# Patient Record
Sex: MALE | Race: WHITE | NOT HISPANIC OR LATINO | Employment: OTHER | URBAN - NONMETROPOLITAN AREA
[De-identification: names, ages, dates, MRNs, and addresses within clinical notes are randomized per-mention and may not be internally consistent; named-entity substitution may affect disease eponyms.]

---

## 2017-01-06 ENCOUNTER — ANTICOAGULATION VISIT (OUTPATIENT)
Dept: CARDIOLOGY | Facility: CLINIC | Age: 82
End: 2017-01-06

## 2017-01-10 ENCOUNTER — ANTICOAGULATION VISIT (OUTPATIENT)
Dept: CARDIOLOGY | Facility: CLINIC | Age: 82
End: 2017-01-10

## 2017-01-10 LAB — INR PPP: 2.51

## 2017-02-08 ENCOUNTER — ANTICOAGULATION VISIT (OUTPATIENT)
Dept: CARDIOLOGY | Facility: CLINIC | Age: 82
End: 2017-02-08

## 2017-02-08 LAB — INR PPP: 2.81

## 2017-03-08 ENCOUNTER — ANTICOAGULATION VISIT (OUTPATIENT)
Dept: CARDIOLOGY | Facility: CLINIC | Age: 82
End: 2017-03-08

## 2017-03-08 LAB — INR PPP: 2.44

## 2017-04-06 ENCOUNTER — ANTICOAGULATION VISIT (OUTPATIENT)
Dept: CARDIOLOGY | Facility: CLINIC | Age: 82
End: 2017-04-06

## 2017-04-06 LAB — INR PPP: 2.77

## 2017-05-08 ENCOUNTER — ANTICOAGULATION VISIT (OUTPATIENT)
Dept: CARDIOLOGY | Facility: CLINIC | Age: 82
End: 2017-05-08

## 2017-05-08 LAB — INR PPP: 2.7

## 2017-06-07 ENCOUNTER — ANTICOAGULATION VISIT (OUTPATIENT)
Dept: CARDIOLOGY | Facility: CLINIC | Age: 82
End: 2017-06-07

## 2017-06-07 LAB — INR PPP: 3.1

## 2017-06-09 RX ORDER — DILTIAZEM HYDROCHLORIDE 180 MG/1
180 CAPSULE, COATED, EXTENDED RELEASE ORAL DAILY
COMMUNITY
End: 2017-10-18 | Stop reason: DRUGHIGH

## 2017-06-09 RX ORDER — WARFARIN SODIUM 5 MG/1
5 TABLET ORAL EVERY OTHER DAY
COMMUNITY

## 2017-06-09 RX ORDER — FUROSEMIDE 40 MG/1
40 TABLET ORAL DAILY
COMMUNITY

## 2017-06-09 RX ORDER — ASPIRIN 325 MG
81 TABLET, DELAYED RELEASE (ENTERIC COATED) ORAL DAILY
COMMUNITY
End: 2017-06-26 | Stop reason: DRUGHIGH

## 2017-06-09 RX ORDER — AMLODIPINE BESYLATE AND BENAZEPRIL HYDROCHLORIDE 5; 20 MG/1; MG/1
1 CAPSULE ORAL DAILY
COMMUNITY
End: 2019-06-10 | Stop reason: ALTCHOICE

## 2017-06-09 RX ORDER — METOPROLOL SUCCINATE 25 MG/1
25 TABLET, EXTENDED RELEASE ORAL DAILY
COMMUNITY

## 2017-06-20 ENCOUNTER — ANTICOAGULATION VISIT (OUTPATIENT)
Dept: CARDIOLOGY | Facility: CLINIC | Age: 82
End: 2017-06-20

## 2017-06-20 LAB — INR PPP: 2.29

## 2017-06-26 ENCOUNTER — OFFICE VISIT (OUTPATIENT)
Dept: CARDIOLOGY | Facility: CLINIC | Age: 82
End: 2017-06-26

## 2017-06-26 VITALS
OXYGEN SATURATION: 98 % | HEIGHT: 73 IN | SYSTOLIC BLOOD PRESSURE: 119 MMHG | WEIGHT: 253.6 LBS | HEART RATE: 61 BPM | BODY MASS INDEX: 33.61 KG/M2 | DIASTOLIC BLOOD PRESSURE: 51 MMHG

## 2017-06-26 DIAGNOSIS — R06.09 DYSPNEA ON EXERTION: ICD-10-CM

## 2017-06-26 DIAGNOSIS — I48.91 ATRIAL FIBRILLATION, UNSPECIFIED TYPE (HCC): Primary | ICD-10-CM

## 2017-06-26 DIAGNOSIS — R07.9 CHEST PAIN, UNSPECIFIED TYPE: ICD-10-CM

## 2017-06-26 DIAGNOSIS — E78.2 MIXED HYPERLIPIDEMIA: ICD-10-CM

## 2017-06-26 DIAGNOSIS — I10 ESSENTIAL HYPERTENSION: ICD-10-CM

## 2017-06-26 DIAGNOSIS — M79.89 LEG SWELLING: ICD-10-CM

## 2017-06-26 DIAGNOSIS — I25.10 CORONARY ARTERY DISEASE INVOLVING NATIVE CORONARY ARTERY OF NATIVE HEART WITHOUT ANGINA PECTORIS: ICD-10-CM

## 2017-06-26 PROCEDURE — 99214 OFFICE O/P EST MOD 30 MIN: CPT | Performed by: PHYSICIAN ASSISTANT

## 2017-06-26 PROCEDURE — 93000 ELECTROCARDIOGRAM COMPLETE: CPT | Performed by: PHYSICIAN ASSISTANT

## 2017-06-26 RX ORDER — WARFARIN SODIUM 2.5 MG/1
2.5 TABLET ORAL EVERY OTHER DAY
COMMUNITY
End: 2021-06-14

## 2017-06-26 RX ORDER — ROSUVASTATIN CALCIUM 10 MG/1
10 TABLET, COATED ORAL DAILY
COMMUNITY

## 2017-06-26 RX ORDER — ASPIRIN 81 MG/1
81 TABLET, CHEWABLE ORAL DAILY
COMMUNITY

## 2017-06-26 RX ORDER — CELECOXIB 200 MG/1
200 CAPSULE ORAL DAILY
COMMUNITY
End: 2017-06-26 | Stop reason: SINTOL

## 2017-06-26 NOTE — PROGRESS NOTES
Subjective:     Encounter Date:06/26/2017      Patient ID: Cristhian Tatum is a 82 y.o. male w hx of CAD, HTN, HLD, Afib who [facility]. The patient relates he's noticed some left sided chest pain, particularly with exertion, over the last few months. He states this is a dull pain, rating it at worst 3/10. He denies any radiation. He says that he rests and this pain goes away. He reports worsening dyspnea on exertion over the past few months. He relates he is unable to exercise or exert himself much at all due to his dyspnea. He denies any orthopnea but reports worsening BLE edema. He states he is compliant with lasix and continues with leg swelling. He denies palpitations, dizziness, syncope or related.     Chief Complaint:  Coronary Artery Disease   Presents for follow-up visit. Symptoms include chest pain, leg swelling and shortness of breath. Pertinent negatives include no dizziness, palpitations or weight gain. Risk factors include hyperlipidemia. The symptoms have been worsening. Compliance with diet is variable. Compliance with exercise is poor. Compliance with medications is good.   Hypertension   This is a chronic problem. The current episode started more than 1 year ago. The problem is unchanged. The problem is controlled. Associated symptoms include chest pain and shortness of breath. Pertinent negatives include no malaise/fatigue, orthopnea, palpitations or PND. There are no associated agents to hypertension. Risk factors for coronary artery disease include male gender and dyslipidemia. Past treatments include diuretics, calcium channel blockers and beta blockers. The current treatment provides moderate improvement. Compliance problems include exercise and diet.  Hypertensive end-organ damage includes CAD/MI. There is no history of chronic renal disease.   Hyperlipidemia   This is a chronic problem. The current episode started more than 1 year ago. He has no  history of chronic renal disease or diabetes. Associated symptoms include chest pain and shortness of breath. Pertinent negatives include no focal weakness or myalgias. Current antihyperlipidemic treatment includes statins. The current treatment provides moderate improvement of lipids. Compliance problems include adherence to exercise and adherence to diet.  Risk factors for coronary artery disease include dyslipidemia, hypertension, male sex and obesity.   Atrial Fibrillation   Presents for follow-up visit. Symptoms include chest pain and shortness of breath. Symptoms are negative for dizziness, palpitations, syncope and tachycardia. Past medical history includes atrial fibrillation, CAD and hyperlipidemia. There are no medication compliance problems.       The following portions of the patient's history were reviewed and updated as appropriate: allergies, current medications, past family history, past medical history, past social history, past surgical history and problem list.    Review of Systems   Constitution: Negative for malaise/fatigue and weight gain.   Cardiovascular: Positive for chest pain, dyspnea on exertion and leg swelling. Negative for claudication, irregular heartbeat, near-syncope, orthopnea, palpitations, paroxysmal nocturnal dyspnea and syncope.   Respiratory: Positive for shortness of breath. Negative for hemoptysis.    Hematologic/Lymphatic: Negative for bleeding problem.   Skin: Negative for poor wound healing.   Musculoskeletal: Negative for myalgias.   Gastrointestinal: Negative for melena, nausea and vomiting.   Genitourinary: Negative for hematuria.   Neurological: Negative for dizziness, focal weakness and light-headedness.   Psychiatric/Behavioral: Negative for memory loss.   All other systems reviewed and are negative.        ECG 12 Lead  Date/Time: 6/26/2017 4:22 PM  Performed by: DANNA VILLASEÑOR  Authorized by: DANNA VILLASEÑOR   Comparison: compared with previous ECG from  "2/18/2016  Similar to previous ECG  Rhythm: atrial fibrillation  Rate: normal  Conduction: incomplete RBBB  ST Flattening: all  QRS axis: normal  Clinical impression: abnormal ECG               Objective:     Physical Exam   Constitutional: He is oriented to person, place, and time. He appears well-developed and well-nourished.   HENT:   Head: Normocephalic and atraumatic.   Eyes: Conjunctivae and EOM are normal. Pupils are equal, round, and reactive to light.   Neck: Normal range of motion. Neck supple. No JVD present.   Cardiovascular: Normal rate, S1 normal, S2 normal, normal heart sounds, intact distal pulses and normal pulses.  An irregularly irregular rhythm present.   No murmur heard.  Pulmonary/Chest: Effort normal and breath sounds normal. No respiratory distress.   Abdominal: Soft. Bowel sounds are normal. He exhibits no distension.   Musculoskeletal: He exhibits edema.   Neurological: He is alert and oriented to person, place, and time.   Skin: Skin is warm and dry.   Psychiatric: He has a normal mood and affect. Judgment normal.     /51 (BP Location: Right arm, Patient Position: Sitting, Cuff Size: Adult)  Pulse 61  Ht 73\" (185.4 cm)  Wt 253 lb 9.6 oz (115 kg)  SpO2 98%  BMI 33.46 kg/m2    Current Outpatient Prescriptions:   •  amLODIPine-benazepril (LOTREL 5-20) 5-20 MG per capsule, Take 1 capsule by mouth Daily., Disp: , Rfl:   •  aspirin 81 MG chewable tablet, Chew 81 mg Daily., Disp: , Rfl:   •  diltiaZEM CD (CARDIZEM CD) 180 MG 24 hr capsule, Take 180 mg by mouth Daily., Disp: , Rfl:   •  furosemide (LASIX) 40 MG tablet, Take 40 mg by mouth Daily., Disp: , Rfl:   •  metoprolol succinate XL (TOPROL-XL) 25 MG 24 hr tablet, Take 25 mg by mouth Daily., Disp: , Rfl:   •  rosuvastatin (CRESTOR) 10 MG tablet, Take 10 mg by mouth Daily., Disp: , Rfl:   •  warfarin (COUMADIN) 2.5 MG tablet, Take 2.5 mg by mouth Every Other Day. Every other day, Disp: , Rfl:   •  warfarin (COUMADIN) 5 MG tablet, " Take 5 mg by mouth Every Other Day., Disp: , Rfl:   Past Medical History:   Diagnosis Date   • A-fib    • CAD (coronary artery disease)    • Cancer    • HLD (hyperlipidemia)    • HTN (hypertension)      Past Surgical History:   Procedure Laterality Date   • CORONARY ANGIOPLASTY WITH STENT PLACEMENT     • TOTAL HIP ARTHROPLASTY       No Known Allergies  Social History     Social History   • Marital status:      Spouse name: N/A   • Number of children: N/A   • Years of education: N/A     Occupational History   • Not on file.     Social History Main Topics   • Smoking status: Never Smoker   • Smokeless tobacco: Never Used   • Alcohol use No   • Drug use: No   • Sexual activity: Defer     Other Topics Concern   • Not on file     Social History Narrative     Family History   Problem Relation Age of Onset   • Stroke Mother    • Cancer Father            Assessment:          Diagnosis Plan   1. Atrial fibrillation, unspecified type     2. Coronary artery disease involving native coronary artery of native heart without angina pectoris     3. Essential hypertension     4. Mixed hyperlipidemia      on statin, followed by PCP   5. Dyspnea on exertion  Adult Transthoracic Echo Complete With Contrast    Stress Test With Myocardial Perfusion - One Day   6. Chest pain, unspecified type  Stress Test With Myocardial Perfusion - One Day   7. Leg swelling  Adult Transthoracic Echo Complete With Contrast          Plan:       1. Echo   2. Lexiscan  3. Counseled on diet, exercise, medication compliance. Continue Lasix as prescribed.   4. Follow-up with Dr. Barbosa in 3 months, unless needed sooner  5. Present to ED if chest pain recurs and unrelieved by NTG x 3 within 15 minutes.   6. Verbalized understanding of instructions.

## 2017-07-20 ENCOUNTER — HOSPITAL ENCOUNTER (OUTPATIENT)
Dept: CARDIOLOGY | Facility: HOSPITAL | Age: 82
Discharge: HOME | End: 2017-07-20

## 2017-07-20 ENCOUNTER — HOSPITAL ENCOUNTER (OUTPATIENT)
Dept: CARDIOLOGY | Facility: HOSPITAL | Age: 82
Discharge: HOME | End: 2017-07-20
Admitting: PHYSICIAN ASSISTANT

## 2017-07-20 VITALS — HEART RATE: 63 BPM | SYSTOLIC BLOOD PRESSURE: 121 MMHG | DIASTOLIC BLOOD PRESSURE: 6 MMHG

## 2017-07-20 VITALS
HEIGHT: 73 IN | SYSTOLIC BLOOD PRESSURE: 119 MMHG | WEIGHT: 253 LBS | BODY MASS INDEX: 33.53 KG/M2 | DIASTOLIC BLOOD PRESSURE: 51 MMHG

## 2017-07-20 DIAGNOSIS — M79.89 LEG SWELLING: ICD-10-CM

## 2017-07-20 DIAGNOSIS — R06.09 DYSPNEA ON EXERTION: ICD-10-CM

## 2017-07-20 DIAGNOSIS — R07.9 CHEST PAIN, UNSPECIFIED TYPE: ICD-10-CM

## 2017-07-20 LAB
BH CV ECHO MEAS - AI DEC SLOPE: 177 CM/SEC^2
BH CV ECHO MEAS - AI MAX PG: 43.3 MMHG
BH CV ECHO MEAS - AI MAX VEL: 329 CM/SEC
BH CV ECHO MEAS - AI P1/2T: 544.4 MSEC
BH CV ECHO MEAS - AO MAX PG (FULL): 4.3 MMHG
BH CV ECHO MEAS - AO MAX PG: 6.7 MMHG
BH CV ECHO MEAS - AO MEAN PG (FULL): 2 MMHG
BH CV ECHO MEAS - AO MEAN PG: 3 MMHG
BH CV ECHO MEAS - AO ROOT AREA (BSA CORRECTED): 1.3
BH CV ECHO MEAS - AO ROOT AREA: 8 CM^2
BH CV ECHO MEAS - AO ROOT DIAM: 3.2 CM
BH CV ECHO MEAS - AO V2 MAX: 129 CM/SEC
BH CV ECHO MEAS - AO V2 MEAN: 81.4 CM/SEC
BH CV ECHO MEAS - AO V2 VTI: 28.1 CM
BH CV ECHO MEAS - AVA(I,A): 2.4 CM^2
BH CV ECHO MEAS - AVA(I,D): 2.4 CM^2
BH CV ECHO MEAS - AVA(V,A): 2.1 CM^2
BH CV ECHO MEAS - AVA(V,D): 2.1 CM^2
BH CV ECHO MEAS - BSA(HAYCOCK): 2.5 M^2
BH CV ECHO MEAS - BSA: 2.4 M^2
BH CV ECHO MEAS - BZI_BMI: 33.4 KILOGRAMS/M^2
BH CV ECHO MEAS - BZI_METRIC_HEIGHT: 185.4 CM
BH CV ECHO MEAS - BZI_METRIC_WEIGHT: 114.8 KG
BH CV ECHO MEAS - CONTRAST EF 4CH: 64.3 ML/M^2
BH CV ECHO MEAS - EDV(CUBED): 148.9 ML
BH CV ECHO MEAS - EDV(MOD-SP4): 129 ML
BH CV ECHO MEAS - EDV(TEICH): 135.3 ML
BH CV ECHO MEAS - EF(CUBED): 73.6 %
BH CV ECHO MEAS - EF(MOD-SP4): 64.3 %
BH CV ECHO MEAS - EF(TEICH): 65 %
BH CV ECHO MEAS - ESV(CUBED): 39.3 ML
BH CV ECHO MEAS - ESV(MOD-SP4): 46 ML
BH CV ECHO MEAS - ESV(TEICH): 47.4 ML
BH CV ECHO MEAS - FS: 35.8 %
BH CV ECHO MEAS - IVS/LVPW: 0.89
BH CV ECHO MEAS - IVSD: 0.8 CM
BH CV ECHO MEAS - LA DIMENSION: 5.2 CM
BH CV ECHO MEAS - LA/AO: 1.6
BH CV ECHO MEAS - LAT PEAK E' VEL: 10.9 CM/SEC
BH CV ECHO MEAS - LV DIASTOLIC VOL/BSA (35-75): 54.2 ML/M^2
BH CV ECHO MEAS - LV MASS(C)D: 162.1 GRAMS
BH CV ECHO MEAS - LV MASS(C)DI: 68.2 GRAMS/M^2
BH CV ECHO MEAS - LV MAX PG: 2.4 MMHG
BH CV ECHO MEAS - LV MEAN PG: 1 MMHG
BH CV ECHO MEAS - LV SYSTOLIC VOL/BSA (12-30): 19.3 ML/M^2
BH CV ECHO MEAS - LV V1 MAX: 76.9 CM/SEC
BH CV ECHO MEAS - LV V1 MEAN: 50.5 CM/SEC
BH CV ECHO MEAS - LV V1 VTI: 19.8 CM
BH CV ECHO MEAS - LVIDD: 5.3 CM
BH CV ECHO MEAS - LVIDS: 3.4 CM
BH CV ECHO MEAS - LVLD AP4: 7.3 CM
BH CV ECHO MEAS - LVLS AP4: 6.4 CM
BH CV ECHO MEAS - LVOT AREA (M): 3.5 CM^2
BH CV ECHO MEAS - LVOT AREA: 3.5 CM^2
BH CV ECHO MEAS - LVOT DIAM: 2.1 CM
BH CV ECHO MEAS - LVPWD: 0.9 CM
BH CV ECHO MEAS - MED PEAK E' VEL: 8.05 CM/SEC
BH CV ECHO MEAS - MR ALIAS VEL: 30 CM/SEC
BH CV ECHO MEAS - MR ERO: 0.04 CM^2
BH CV ECHO MEAS - MR FLOW RATE: 17 CM^3/SEC
BH CV ECHO MEAS - MR MAX PG: 71.6 MMHG
BH CV ECHO MEAS - MR MAX VEL: 423 CM/SEC
BH CV ECHO MEAS - MR MEAN PG: 45 MMHG
BH CV ECHO MEAS - MR MEAN VEL: 302 CM/SEC
BH CV ECHO MEAS - MR PISA RADIUS: 0.3 CM
BH CV ECHO MEAS - MR PISA: 0.57 CM^2
BH CV ECHO MEAS - MR VOLUME: 6 ML
BH CV ECHO MEAS - MR VTI: 149 CM
BH CV ECHO MEAS - MV DEC TIME: 0.21 SEC
BH CV ECHO MEAS - MV E MAX VEL: 97 CM/SEC
BH CV ECHO MEAS - RAP SYSTOLE: 10 MMHG
BH CV ECHO MEAS - RVDD: 4.3 CM
BH CV ECHO MEAS - RVSP: 41.8 MMHG
BH CV ECHO MEAS - SI(AO): 95 ML/M^2
BH CV ECHO MEAS - SI(CUBED): 46.1 ML/M^2
BH CV ECHO MEAS - SI(LVOT): 28.8 ML/M^2
BH CV ECHO MEAS - SI(MOD-SP4): 34.9 ML/M^2
BH CV ECHO MEAS - SI(TEICH): 37 ML/M^2
BH CV ECHO MEAS - SV(AO): 226 ML
BH CV ECHO MEAS - SV(CUBED): 109.6 ML
BH CV ECHO MEAS - SV(LVOT): 68.6 ML
BH CV ECHO MEAS - SV(MOD-SP4): 83 ML
BH CV ECHO MEAS - SV(TEICH): 87.9 ML
BH CV ECHO MEAS - TR MAX VEL: 282 CM/SEC
BH CV STRESS BP STAGE 1: NORMAL
BH CV STRESS COMMENTS STAGE 1: NORMAL
BH CV STRESS DOSE REGADENOSON STAGE 1: 0.4
BH CV STRESS DURATION MIN STAGE 1: 0
BH CV STRESS DURATION SEC STAGE 1: 10
BH CV STRESS HR STAGE 1: 70
BH CV STRESS PROTOCOL 1: NORMAL
BH CV STRESS RECOVERY BP: NORMAL MMHG
BH CV STRESS RECOVERY HR: 75 BPM
BH CV STRESS STAGE 1: 1
E/E' RATIO: 12
LEFT ATRIUM VOLUME INDEX: 47.9 ML/M2
LEFT ATRIUM VOLUME: 114 CM3
LV EF 2D ECHO EST: 65 %
LV EF NUC BP: 56 %
MAXIMAL PREDICTED HEART RATE: 138 BPM
PERCENT MAX PREDICTED HR: 50.72 %
STRESS BASELINE BP: NORMAL MMHG
STRESS BASELINE HR: 63 BPM
STRESS PERCENT HR: 60 %
STRESS POST EXERCISE DUR SEC: 10 SEC
STRESS POST PEAK BP: NORMAL MMHG
STRESS POST PEAK HR: 70 BPM
STRESS TARGET HR: 117 BPM

## 2017-07-20 PROCEDURE — C8929 TTE W OR WO FOL WCON,DOPPLER: HCPCS

## 2017-07-20 PROCEDURE — 93018 CV STRESS TEST I&R ONLY: CPT | Performed by: INTERNAL MEDICINE

## 2017-07-20 PROCEDURE — A9500 TC99M SESTAMIBI: HCPCS | Performed by: PHYSICIAN ASSISTANT

## 2017-07-20 PROCEDURE — 78452 HT MUSCLE IMAGE SPECT MULT: CPT | Performed by: INTERNAL MEDICINE

## 2017-07-20 PROCEDURE — 25010000002 PERFLUTREN (DEFINITY) 8.476 MG IN SODIUM CHLORIDE 10 ML INJECTION: Performed by: PHYSICIAN ASSISTANT

## 2017-07-20 PROCEDURE — 93017 CV STRESS TEST TRACING ONLY: CPT

## 2017-07-20 PROCEDURE — 93306 TTE W/DOPPLER COMPLETE: CPT | Performed by: INTERNAL MEDICINE

## 2017-07-20 PROCEDURE — 0 TECHNETIUM SESTAMIBI: Performed by: PHYSICIAN ASSISTANT

## 2017-07-20 PROCEDURE — 78452 HT MUSCLE IMAGE SPECT MULT: CPT

## 2017-07-20 PROCEDURE — 25010000002 REGADENOSON 0.4 MG/5ML SOLUTION: Performed by: INTERNAL MEDICINE

## 2017-07-20 RX ADMIN — Medication 1 DOSE: at 09:10

## 2017-07-20 RX ADMIN — Medication 1 DOSE: at 07:57

## 2017-07-20 RX ADMIN — REGADENOSON 0.4 MG: 0.08 INJECTION, SOLUTION INTRAVENOUS at 09:00

## 2017-07-20 RX ADMIN — SODIUM CHLORIDE 6 ML: 9 INJECTION INTRAMUSCULAR; INTRAVENOUS; SUBCUTANEOUS at 08:20

## 2017-08-02 ENCOUNTER — ANTICOAGULATION VISIT (OUTPATIENT)
Dept: CARDIOLOGY | Facility: CLINIC | Age: 82
End: 2017-08-02

## 2017-08-02 LAB — INR PPP: 2.91

## 2017-09-05 ENCOUNTER — ANTICOAGULATION VISIT (OUTPATIENT)
Dept: CARDIOLOGY | Facility: CLINIC | Age: 82
End: 2017-09-05

## 2017-09-05 LAB — INR PPP: 1.82

## 2017-09-19 ENCOUNTER — ANTICOAGULATION VISIT (OUTPATIENT)
Dept: CARDIOLOGY | Facility: CLINIC | Age: 82
End: 2017-09-19

## 2017-09-19 LAB — INR PPP: 1.6

## 2017-09-27 ENCOUNTER — ANTICOAGULATION VISIT (OUTPATIENT)
Dept: CARDIOLOGY | Facility: CLINIC | Age: 82
End: 2017-09-27

## 2017-09-27 LAB — INR PPP: 1.98

## 2017-10-04 ENCOUNTER — ANTICOAGULATION VISIT (OUTPATIENT)
Dept: CARDIOLOGY | Facility: CLINIC | Age: 82
End: 2017-10-04

## 2017-10-04 LAB — INR PPP: 3.43

## 2017-10-11 ENCOUNTER — ANTICOAGULATION VISIT (OUTPATIENT)
Dept: CARDIOLOGY | Facility: CLINIC | Age: 82
End: 2017-10-11

## 2017-10-11 LAB — INR PPP: 2.27

## 2017-10-18 ENCOUNTER — OFFICE VISIT (OUTPATIENT)
Dept: CARDIOLOGY | Facility: CLINIC | Age: 82
End: 2017-10-18

## 2017-10-18 VITALS
HEIGHT: 73 IN | HEART RATE: 78 BPM | WEIGHT: 226 LBS | DIASTOLIC BLOOD PRESSURE: 80 MMHG | BODY MASS INDEX: 29.95 KG/M2 | RESPIRATION RATE: 18 BRPM | SYSTOLIC BLOOD PRESSURE: 122 MMHG

## 2017-10-18 DIAGNOSIS — I25.10 CORONARY ARTERY DISEASE INVOLVING NATIVE CORONARY ARTERY OF NATIVE HEART WITHOUT ANGINA PECTORIS: ICD-10-CM

## 2017-10-18 DIAGNOSIS — R06.02 SHORTNESS OF BREATH: Primary | ICD-10-CM

## 2017-10-18 DIAGNOSIS — I48.20 CHRONIC ATRIAL FIBRILLATION (HCC): ICD-10-CM

## 2017-10-18 DIAGNOSIS — I10 ESSENTIAL HYPERTENSION: ICD-10-CM

## 2017-10-18 PROCEDURE — 93000 ELECTROCARDIOGRAM COMPLETE: CPT | Performed by: NURSE PRACTITIONER

## 2017-10-18 PROCEDURE — 99213 OFFICE O/P EST LOW 20 MIN: CPT | Performed by: NURSE PRACTITIONER

## 2017-10-18 RX ORDER — BUDESONIDE AND FORMOTEROL FUMARATE DIHYDRATE 160; 4.5 UG/1; UG/1
2 AEROSOL RESPIRATORY (INHALATION)
COMMUNITY
End: 2018-06-06 | Stop reason: ALTCHOICE

## 2017-10-18 RX ORDER — DILTIAZEM HCL 90 MG
90 TABLET ORAL 2 TIMES DAILY
COMMUNITY

## 2017-10-18 RX ORDER — ALBUTEROL SULFATE 90 UG/1
2 AEROSOL, METERED RESPIRATORY (INHALATION) EVERY 4 HOURS PRN
COMMUNITY

## 2017-10-18 NOTE — PROGRESS NOTES
"    Subjective:     Encounter Date:10/18/2017      Patient ID: Cristhian Tatum is a 82 y.o. male.    Chief Complaint:  HPI Comments: The patient reports he continues to have chest pain and shortness of breath. The chest pain is described as a non exertional pressure, without specific aggravating or alleviating factors. He reports his primary concern is his shortness of breath. It does not occur daily. It comes and goes. It is worse with weather changes. It is improved with albuterol. He denies sudden weight gain- he has actually had weight loss. He admits intermittent lower extremity edema- none currently. He denies palpitations, syncope or pre syncope. He states his heart rate is well controlled per his home readings in the 70s.    Shortness of Breath   This is a chronic problem. The current episode started more than 1 month ago. The problem occurs intermittently. The problem has been gradually worsening. Associated symptoms include chest pain and leg swelling. Pertinent negatives include no abdominal pain, claudication, fever, hemoptysis, orthopnea, PND, sputum production, syncope, vomiting or wheezing. The symptoms are aggravated by weather changes. He has tried beta agonist inhalers for the symptoms. The treatment provided significant relief.       The following portions of the patient's history were reviewed and updated as appropriate: allergies, current medications, past family history, past medical history, past social history, past surgical history and problem list.  /80 (BP Location: Right arm, Patient Position: Sitting, Cuff Size: Adult)  Pulse 78  Resp 18  Ht 73\" (185.4 cm)  Wt 226 lb (103 kg)  BMI 29.82 kg/m2  No Known Allergies    Current Outpatient Prescriptions:   •  albuterol (VENTOLIN HFA) 108 (90 Base) MCG/ACT inhaler, Inhale 2 puffs Every 4 (Four) Hours As Needed for Wheezing., Disp: , Rfl:   •  amLODIPine-benazepril (LOTREL 5-20) 5-20 MG per capsule, Take 1 capsule by mouth Daily., " Disp: , Rfl:   •  aspirin 81 MG chewable tablet, Chew 81 mg Daily., Disp: , Rfl:   •  budesonide-formoterol (SYMBICORT) 160-4.5 MCG/ACT inhaler, Inhale 2 puffs 2 (Two) Times a Day., Disp: , Rfl:   •  diltiaZEM (CARDIZEM) 90 MG tablet, Take 90 mg by mouth 2 (Two) Times a Day., Disp: , Rfl:   •  furosemide (LASIX) 40 MG tablet, Take 40 mg by mouth Daily., Disp: , Rfl:   •  metoprolol succinate XL (TOPROL-XL) 25 MG 24 hr tablet, Take 25 mg by mouth Daily., Disp: , Rfl:   •  rosuvastatin (CRESTOR) 10 MG tablet, Take 10 mg by mouth Daily., Disp: , Rfl:   •  warfarin (COUMADIN) 2.5 MG tablet, Take 2.5 mg by mouth Every Other Day. Every other day, Disp: , Rfl:   •  warfarin (COUMADIN) 5 MG tablet, Take 5 mg by mouth Every Other Day., Disp: , Rfl:   Past Medical History:   Diagnosis Date   • A-fib    • Abnormal cardiovascular function study    • Atherosclerosis of native coronary artery of native heart without angina pectoris    • CAD (coronary artery disease)    • Cancer    • Chronic atrial fibrillation    • Exertional dyspnea    • History of essential hypertension    • HLD (hyperlipidemia)    • HTN (hypertension)    • Hyperlipidemia      Past Surgical History:   Procedure Laterality Date   • CARDIAC CATHETERIZATION     • CORONARY ANGIOPLASTY WITH STENT PLACEMENT Left 06/15/2000    Stent to RCA   • TOTAL HIP ARTHROPLASTY  10/14/2008     Social History     Social History   • Marital status:      Spouse name: N/A   • Number of children: N/A   • Years of education: N/A     Occupational History   • Not on file.     Social History Main Topics   • Smoking status: Never Smoker   • Smokeless tobacco: Never Used   • Alcohol use No   • Drug use: No   • Sexual activity: Defer     Other Topics Concern   • Not on file     Social History Narrative     Family History   Problem Relation Age of Onset   • Stroke Mother    • Cancer Father        Review of Systems   Constitution: Positive for malaise/fatigue and weight loss. Negative  for chills, diaphoresis, fever and weakness.   HENT: Negative for nosebleeds.    Eyes: Negative for visual disturbance.   Cardiovascular: Positive for chest pain, dyspnea on exertion and leg swelling. Negative for claudication, cyanosis, irregular heartbeat, near-syncope, orthopnea, palpitations, paroxysmal nocturnal dyspnea and syncope.   Respiratory: Positive for shortness of breath. Negative for cough, hemoptysis, sputum production and wheezing.    Hematologic/Lymphatic: Negative for bleeding problem.   Skin: Negative for color change and flushing.   Musculoskeletal: Negative for falls.   Gastrointestinal: Negative for bloating, abdominal pain, hematemesis, hematochezia, melena, nausea and vomiting.   Genitourinary: Negative for hematuria.   Neurological: Negative for dizziness and light-headedness.   Psychiatric/Behavioral: Negative for altered mental status.         ECG 12 Lead  Date/Time: 10/18/2017 5:11 PM  Performed by: FLASH JUAREZ  Authorized by: FLASH JUAREZ   Comparison: compared with previous ECG from 6/26/2017  Similar to previous ECG  Rhythm: atrial fibrillation  Ectopy: PVCs               Objective:     Physical Exam   Constitutional: He is oriented to person, place, and time. He appears well-developed and well-nourished. No distress.   HENT:   Head: Normocephalic and atraumatic.   Eyes: Pupils are equal, round, and reactive to light.   Neck: Normal range of motion. Neck supple. No JVD present. No thyromegaly present.   Cardiovascular: Normal rate, normal heart sounds and intact distal pulses.  An irregular rhythm present. Exam reveals no gallop and no friction rub.    No murmur heard.  Pulmonary/Chest: Effort normal and breath sounds normal. No respiratory distress. He has no wheezes. He has no rales. He exhibits no tenderness.   Abdominal: Soft. Bowel sounds are normal. He exhibits no distension. There is no tenderness.   Musculoskeletal: Normal range of motion. He exhibits no edema.    Neurological: He is alert and oriented to person, place, and time. No cranial nerve deficit.   Skin: Skin is warm and dry. He is not diaphoretic.   Psychiatric: He has a normal mood and affect. His behavior is normal.       Lab Review:       Assessment:          Diagnosis Plan   1. Shortness of breath  Refer to pulmonary- see HPI and plan below   2. Chronic atrial fibrillation  Rate controlled, anticoagulated with warfarin    3. Coronary artery disease involving native coronary artery of native heart without angina pectoris  Non obstructive CAD by cath in 2010.  Lexiscan is negative   Continue current medical therapy  Atypical chest pain    4. Essential hypertension  Controlled      Echo reveals normal LVEF, moderate LAE, moderate RV dilation, mildly reduced right ventricular systolic dysfunction,  Mild MR, AR, and TR. Consider lung disease as source of RV dilation- refer to pulmonary. Not as suspicious for PE given his anticoagulated state and the nature of his dyspnea.   No evidence of acute heart failure by exam. Reviewed s/s of CHF and what to report.        Plan:       Refer to pulmonology for shortness of breath- patient reports worsening symptoms with weather changes, improvement with albuterol. He is on 2 inhalers but denies a prior pulmonary diagnosis. Yet he states he has seen a pulmonologist in [place] in the remote past. He would like to see a pulmonologist close to home. He states he was exposed to asbestos in the past, but asbestosis has been ruled out he states.  Continue current medical therapy  Follow up in 6 months, sooner with new or worsening symptoms or concerns.

## 2017-11-02 ENCOUNTER — ANTICOAGULATION VISIT (OUTPATIENT)
Dept: CARDIOLOGY | Facility: CLINIC | Age: 82
End: 2017-11-02

## 2017-11-02 LAB — INR PPP: 4.44

## 2017-11-09 ENCOUNTER — ANTICOAGULATION VISIT (OUTPATIENT)
Dept: CARDIOLOGY | Facility: CLINIC | Age: 82
End: 2017-11-09

## 2017-11-09 LAB — INR PPP: 2.09

## 2017-11-16 ENCOUNTER — ANTICOAGULATION VISIT (OUTPATIENT)
Dept: CARDIOLOGY | Facility: CLINIC | Age: 82
End: 2017-11-16

## 2017-11-16 LAB — INR PPP: 2.76

## 2017-11-30 ENCOUNTER — ANTICOAGULATION VISIT (OUTPATIENT)
Dept: CARDIOLOGY | Facility: CLINIC | Age: 82
End: 2017-11-30

## 2017-11-30 LAB — INR PPP: 2.85

## 2017-12-28 ENCOUNTER — ANTICOAGULATION VISIT (OUTPATIENT)
Dept: CARDIOLOGY | Facility: CLINIC | Age: 82
End: 2017-12-28

## 2017-12-28 DIAGNOSIS — I48.91 ATRIAL FIBRILLATION, UNSPECIFIED TYPE (HCC): Primary | ICD-10-CM

## 2017-12-28 LAB — INR PPP: 3.2

## 2018-01-11 ENCOUNTER — ANTICOAGULATION VISIT (OUTPATIENT)
Dept: CARDIOLOGY | Facility: CLINIC | Age: 83
End: 2018-01-11

## 2018-01-11 LAB — INR PPP: 2.69

## 2018-02-08 ENCOUNTER — ANTICOAGULATION VISIT (OUTPATIENT)
Dept: CARDIOLOGY | Facility: CLINIC | Age: 83
End: 2018-02-08

## 2018-02-08 LAB — INR PPP: 2.5

## 2018-03-08 ENCOUNTER — ANTICOAGULATION VISIT (OUTPATIENT)
Dept: CARDIOLOGY | Facility: CLINIC | Age: 83
End: 2018-03-08

## 2018-03-08 LAB — INR PPP: 2.67

## 2018-04-06 ENCOUNTER — ANTICOAGULATION VISIT (OUTPATIENT)
Dept: CARDIOLOGY | Facility: CLINIC | Age: 83
End: 2018-04-06

## 2018-04-06 LAB — INR PPP: 2.72

## 2018-05-09 ENCOUNTER — ANTICOAGULATION VISIT (OUTPATIENT)
Dept: CARDIOLOGY | Facility: CLINIC | Age: 83
End: 2018-05-09

## 2018-05-10 ENCOUNTER — ANTICOAGULATION VISIT (OUTPATIENT)
Dept: CARDIOLOGY | Facility: CLINIC | Age: 83
End: 2018-05-10

## 2018-05-10 LAB — INR PPP: 2.92

## 2018-06-06 ENCOUNTER — OFFICE VISIT (OUTPATIENT)
Dept: CARDIOLOGY | Facility: CLINIC | Age: 83
End: 2018-06-06

## 2018-06-06 VITALS
RESPIRATION RATE: 18 BRPM | SYSTOLIC BLOOD PRESSURE: 120 MMHG | WEIGHT: 235 LBS | BODY MASS INDEX: 31.14 KG/M2 | HEIGHT: 73 IN | DIASTOLIC BLOOD PRESSURE: 72 MMHG | HEART RATE: 76 BPM

## 2018-06-06 DIAGNOSIS — R06.02 SHORTNESS OF BREATH: ICD-10-CM

## 2018-06-06 DIAGNOSIS — I10 ESSENTIAL HYPERTENSION: ICD-10-CM

## 2018-06-06 DIAGNOSIS — I25.10 CORONARY ARTERY DISEASE INVOLVING NATIVE CORONARY ARTERY OF NATIVE HEART WITHOUT ANGINA PECTORIS: ICD-10-CM

## 2018-06-06 DIAGNOSIS — I48.20 CHRONIC ATRIAL FIBRILLATION (HCC): Primary | ICD-10-CM

## 2018-06-06 DIAGNOSIS — E78.2 MIXED HYPERLIPIDEMIA: ICD-10-CM

## 2018-06-06 PROCEDURE — 93000 ELECTROCARDIOGRAM COMPLETE: CPT | Performed by: NURSE PRACTITIONER

## 2018-06-06 PROCEDURE — 99214 OFFICE O/P EST MOD 30 MIN: CPT | Performed by: NURSE PRACTITIONER

## 2018-06-06 RX ORDER — ROPINIROLE 0.5 MG/1
0.5 TABLET, FILM COATED ORAL NIGHTLY
COMMUNITY
End: 2019-06-10

## 2018-06-06 RX ORDER — NITROGLYCERIN 0.4 MG/1
TABLET SUBLINGUAL
Qty: 30 TABLET | Refills: 2 | Status: SHIPPED | OUTPATIENT
Start: 2018-06-06

## 2018-06-06 NOTE — PROGRESS NOTES
Subjective:     Encounter Date:06/06/2018      Patient ID: Cristhian Tatum is a 83 y.o. male.    Chief Complaint:  Atrial Fibrillation   Presents for follow-up visit. Symptoms include shortness of breath. Symptoms are negative for chest pain, dizziness, palpitations, syncope, tachycardia and weakness. The symptoms have been stable. Past medical history includes atrial fibrillation, CAD and hyperlipidemia. There are no medication compliance problems.   Hypertension   This is a chronic problem. The current episode started more than 1 year ago. The problem is controlled. Associated symptoms include shortness of breath. Pertinent negatives include no chest pain, headaches, malaise/fatigue, orthopnea, palpitations or PND. Current antihypertension treatment includes ACE inhibitors, calcium channel blockers and beta blockers. The current treatment provides significant improvement.   Coronary Artery Disease   Presents for follow-up visit. Symptoms include shortness of breath. Pertinent negatives include no chest pain, chest pressure, chest tightness, dizziness, leg swelling, palpitations or weight gain. Risk factors include hyperlipidemia. The symptoms have been stable. Compliance with diet is good. Compliance with exercise is good. Compliance with medications is good.   Hyperlipidemia   This is a chronic problem. The current episode started more than 1 year ago. Recent lipid tests were reviewed and are normal. Associated symptoms include shortness of breath. Pertinent negatives include no chest pain. Current antihyperlipidemic treatment includes statins.   Shortness of Breath   This is a chronic problem. The current episode started more than 1 year ago. The problem has been gradually improving. Pertinent negatives include no abdominal pain, chest pain, fever, headaches, hemoptysis, leg swelling, orthopnea, PND, rash, syncope or vomiting. He has tried steroid inhalers for the symptoms. The treatment provided moderate  relief. His past medical history is significant for CAD.     Patient presents today for routine follow up for atrial fibrillation, nonobstructive coronary artery disease, hyperlipidemia, hypertension and sleep apnea. At last office visit patient was worked up for worsening shortness of breath- patient had an echo and stress which was reviewed with patient- both were essentially normal. Patient was then referred to pulmonary- who switched inhalers and patient has noticed an improvement in his breathing. Patient states that he was confirmed to have sleep apnea and is now sleeping with CPAP machine. Overall patient has been doing well. Shortness of breath is still present but improving. A-fib with rate controlled and anticoagulated with coumadin- asymptomatic. Previous cardioversion unsuccessful.     The following portions of the patient's history were reviewed and updated as appropriate: allergies, current medications, past family history, past medical history, past social history, past surgical history and problem list.   Prior to Admission medications    Medication Sig Start Date End Date Taking? Authorizing Provider   albuterol (VENTOLIN HFA) 108 (90 Base) MCG/ACT inhaler Inhale 2 puffs Every 4 (Four) Hours As Needed for Wheezing.   Yes Historical Provider, MD   amLODIPine-benazepril (LOTREL 5-20) 5-20 MG per capsule Take 1 capsule by mouth Daily.   Yes Historical Provider, MD   aspirin 81 MG chewable tablet Chew 81 mg Daily.   Yes Historical Provider, MD   diltiaZEM (CARDIZEM) 90 MG tablet Take 90 mg by mouth 2 (Two) Times a Day.   Yes Historical Provider, MD   furosemide (LASIX) 40 MG tablet Take 40 mg by mouth Daily.   Yes Historical Provider, MD   metoprolol succinate XL (TOPROL-XL) 25 MG 24 hr tablet Take 25 mg by mouth Daily.   Yes Historical Provider, MD   rOPINIRole (REQUIP) 0.5 MG tablet Take 0.5 mg by mouth Every Night. Take 1 hour before bedtime.   Yes Historical Provider, MD   rosuvastatin (CRESTOR) 10  MG tablet Take 10 mg by mouth Daily.   Yes Historical Provider, MD   umeclidinium-vilanterol (ANORO ELLIPTA) 62.5-25 MCG/INH aerosol powder  inhaler Inhale 1 puff Daily.   Yes Historical Provider, MD   warfarin (COUMADIN) 2.5 MG tablet Take 2.5 mg by mouth Every Other Day. Every other day   Yes Historical Provider, MD   warfarin (COUMADIN) 5 MG tablet Take 5 mg by mouth Every Other Day.   Yes Historical Provider, MD   budesonide-formoterol (SYMBICORT) 160-4.5 MCG/ACT inhaler Inhale 2 puffs 2 (Two) Times a Day.  6/6/18  Historical Provider, MD     Past Medical History:   Diagnosis Date   • A-fib    • Abnormal cardiovascular function study    • Atherosclerosis of native coronary artery of native heart without angina pectoris    • CAD (coronary artery disease)    • Cancer    • Chronic atrial fibrillation    • Exertional dyspnea    • History of essential hypertension    • HLD (hyperlipidemia)    • HTN (hypertension)    • Hyperlipidemia    • Sleep apnea        Review of Systems   Constitution: Negative for chills, decreased appetite, fever, weakness, malaise/fatigue, weight gain and weight loss.   HENT: Negative for nosebleeds.    Eyes: Negative for visual disturbance.   Cardiovascular: Negative for chest pain, dyspnea on exertion, leg swelling, near-syncope, orthopnea, palpitations, paroxysmal nocturnal dyspnea and syncope.   Respiratory: Positive for shortness of breath. Negative for chest tightness, cough, hemoptysis and snoring.    Endocrine: Negative for cold intolerance and heat intolerance.   Hematologic/Lymphatic: Negative for bleeding problem. Does not bruise/bleed easily.   Skin: Negative for rash.   Musculoskeletal: Negative for back pain and falls.   Gastrointestinal: Negative for abdominal pain, constipation, diarrhea, heartburn, melena, nausea and vomiting.   Genitourinary: Negative for hematuria.   Neurological: Negative for dizziness, headaches and light-headedness.   Psychiatric/Behavioral: Negative for  "altered mental status.   Allergic/Immunologic: Negative for persistent infections.         ECG 12 Lead  Date/Time: 6/6/2018 2:19 PM  Performed by: RICKY LEIGH  Authorized by: RICKY LEIGH   Comparison: compared with previous ECG from 10/18/2017  Similar to previous ECG  Rhythm: atrial fibrillation               Objective:     Physical Exam   Constitutional: He is oriented to person, place, and time. He appears well-developed and well-nourished.   HENT:   Head: Normocephalic and atraumatic.   Eyes: Pupils are equal, round, and reactive to light.   Neck: Normal range of motion. Neck supple. No JVD present. Carotid bruit is not present.   Cardiovascular: Normal rate, normal heart sounds and intact distal pulses.  An irregular rhythm present.   Pulmonary/Chest: Effort normal and breath sounds normal.   Abdominal: Soft. Bowel sounds are normal.   Musculoskeletal: Normal range of motion.   Neurological: He is alert and oriented to person, place, and time. He has normal reflexes.   Skin: Skin is warm and dry.   Psychiatric: He has a normal mood and affect. His behavior is normal. Judgment and thought content normal.     Blood pressure 120/72, pulse 76, resp. rate 18, height 185.4 cm (73\"), weight 107 kg (235 lb).      Lab Review:       Assessment:          Diagnosis Plan   1. Chronic atrial fibrillation     2. Coronary artery disease involving native coronary artery of native heart without angina pectoris     3. Essential hypertension     4. Mixed hyperlipidemia     5. BMI 31.0-31.9,adult     6. Shortness of breath            Plan:       1. Chronic atrial fibrillation - rate controlled On toprol and Cardizem. Patient is taking cardizem 90 BID- has been on this dosing for many years due to low blood pressure while taking - educated patient and he wishes to stay on current regimen. On Coumadin- managed by our office. Asymptomatic- with failed attempts at NSR.  2. Coronary Artery Disease- nonobstructive. On " statin and Aspirin.  3. Blood Pressure controlled on current regimen- managed by PCP  4. Mixed Hyperlipidemia on Crestor- managed by PCP  5. Patient's Body mass index is 31 kg/m². BMI is above normal parameters. Recommendations include: exercise counseling and nutrition counseling.  6. Shortness of Breath- chronic recent referral to pulmonary started on inhalers and treating for sleep apnea. Improving.

## 2018-07-03 ENCOUNTER — ANTICOAGULATION VISIT (OUTPATIENT)
Dept: CARDIOLOGY | Facility: CLINIC | Age: 83
End: 2018-07-03

## 2018-07-12 ENCOUNTER — ANTICOAGULATION VISIT (OUTPATIENT)
Dept: CARDIOLOGY | Facility: CLINIC | Age: 83
End: 2018-07-12

## 2018-07-12 LAB — INR PPP: 2.81

## 2018-08-09 ENCOUNTER — ANTICOAGULATION VISIT (OUTPATIENT)
Dept: CARDIOLOGY | Facility: CLINIC | Age: 83
End: 2018-08-09

## 2018-08-09 LAB — INR PPP: 2.25

## 2018-09-04 ENCOUNTER — ANTICOAGULATION VISIT (OUTPATIENT)
Dept: CARDIOLOGY | Facility: CLINIC | Age: 83
End: 2018-09-04

## 2018-09-04 LAB — INR PPP: 2.46

## 2018-10-04 ENCOUNTER — ANTICOAGULATION VISIT (OUTPATIENT)
Dept: CARDIOLOGY | Facility: CLINIC | Age: 83
End: 2018-10-04

## 2018-10-04 LAB — INR PPP: 2.3

## 2018-11-08 ENCOUNTER — ANTICOAGULATION VISIT (OUTPATIENT)
Dept: CARDIOLOGY | Facility: CLINIC | Age: 83
End: 2018-11-08

## 2018-11-08 LAB — INR PPP: 2.39

## 2018-12-07 ENCOUNTER — ANTICOAGULATION VISIT (OUTPATIENT)
Dept: CARDIOLOGY | Facility: CLINIC | Age: 83
End: 2018-12-07

## 2018-12-07 DIAGNOSIS — I48.91 ATRIAL FIBRILLATION, UNSPECIFIED TYPE (HCC): Primary | ICD-10-CM

## 2018-12-07 LAB — INR PPP: 2.58

## 2019-01-04 ENCOUNTER — ANTICOAGULATION VISIT (OUTPATIENT)
Dept: CARDIOLOGY | Facility: CLINIC | Age: 84
End: 2019-01-04

## 2019-01-04 LAB — INR PPP: 2.86

## 2019-02-04 ENCOUNTER — ANTICOAGULATION VISIT (OUTPATIENT)
Dept: CARDIOLOGY | Facility: CLINIC | Age: 84
End: 2019-02-04

## 2019-02-04 LAB — INR PPP: 2.52

## 2019-03-04 ENCOUNTER — ANTICOAGULATION VISIT (OUTPATIENT)
Dept: CARDIOLOGY | Facility: CLINIC | Age: 84
End: 2019-03-04

## 2019-03-04 LAB — INR PPP: 2.92

## 2019-04-02 ENCOUNTER — ANTICOAGULATION VISIT (OUTPATIENT)
Dept: CARDIOLOGY | Facility: CLINIC | Age: 84
End: 2019-04-02

## 2019-04-02 LAB — INR PPP: 2.96

## 2019-05-03 ENCOUNTER — ANTICOAGULATION VISIT (OUTPATIENT)
Dept: CARDIOLOGY | Facility: CLINIC | Age: 84
End: 2019-05-03

## 2019-05-03 LAB — INR PPP: 1.98

## 2019-05-09 ENCOUNTER — ANTICOAGULATION VISIT (OUTPATIENT)
Dept: CARDIOLOGY | Facility: CLINIC | Age: 84
End: 2019-05-09

## 2019-05-09 LAB — INR PPP: 1.43

## 2019-05-16 ENCOUNTER — ANTICOAGULATION VISIT (OUTPATIENT)
Dept: CARDIOLOGY | Facility: CLINIC | Age: 84
End: 2019-05-16

## 2019-05-16 LAB — INR PPP: 2.21

## 2019-05-30 ENCOUNTER — ANTICOAGULATION VISIT (OUTPATIENT)
Dept: CARDIOLOGY | Facility: CLINIC | Age: 84
End: 2019-05-30

## 2019-05-30 LAB — INR PPP: 2.85

## 2019-06-10 ENCOUNTER — OFFICE VISIT (OUTPATIENT)
Dept: CARDIOLOGY | Facility: CLINIC | Age: 84
End: 2019-06-10

## 2019-06-10 VITALS
OXYGEN SATURATION: 98 % | HEIGHT: 74 IN | DIASTOLIC BLOOD PRESSURE: 64 MMHG | HEART RATE: 59 BPM | BODY MASS INDEX: 29 KG/M2 | WEIGHT: 226 LBS | SYSTOLIC BLOOD PRESSURE: 128 MMHG

## 2019-06-10 DIAGNOSIS — I48.20 CHRONIC ATRIAL FIBRILLATION (HCC): Primary | ICD-10-CM

## 2019-06-10 DIAGNOSIS — I25.10 CORONARY ARTERY DISEASE INVOLVING NATIVE CORONARY ARTERY OF NATIVE HEART WITHOUT ANGINA PECTORIS: ICD-10-CM

## 2019-06-10 DIAGNOSIS — E78.2 MIXED HYPERLIPIDEMIA: ICD-10-CM

## 2019-06-10 DIAGNOSIS — I10 ESSENTIAL HYPERTENSION: ICD-10-CM

## 2019-06-10 PROCEDURE — 99214 OFFICE O/P EST MOD 30 MIN: CPT | Performed by: INTERNAL MEDICINE

## 2019-06-10 PROCEDURE — 93000 ELECTROCARDIOGRAM COMPLETE: CPT | Performed by: INTERNAL MEDICINE

## 2019-06-10 RX ORDER — BENAZEPRIL HYDROCHLORIDE 20 MG/1
20 TABLET ORAL DAILY
COMMUNITY

## 2019-06-10 NOTE — PROGRESS NOTES
Referring Provider: Darnell Brandon MD    Reason for Follow-up Visit: CAD and afib    Subjective .   Chief Complaint:   Chief Complaint   Patient presents with   • Follow-up     yearly   • Coronary Artery Disease     pt states no complaints just having more sob with just sitting around and its worse when he gets up to do anything.   • Atrial Fibrillation     pt states he does not notice any palpitations.   • Hypertension     pt states his BP medication has been changed because his BP has been good.   • Hyperlipidemia     labs done 11/2018 LDL 50 pt is on crestor 10 mg.       History of present illness:  Cristhian Tatum is a 84 y.o. yo male with history of chronic afib and CAD. Has chronic MCDONALD followed by pulmonary        History  Past Medical History:   Diagnosis Date   • A-fib (CMS/[facility])    • Abnormal cardiovascular function study    • Atherosclerosis of native coronary artery of native heart without angina pectoris    • CAD (coronary artery disease)    • Cancer (CMS/[facility])    • Chronic atrial fibrillation (CMS/[facility])    • Exertional dyspnea    • History of essential hypertension    • HLD (hyperlipidemia)    • HTN (hypertension)    • Hyperlipidemia    • Sleep apnea    ,   Past Surgical History:   Procedure Laterality Date   • CARDIAC CATHETERIZATION     • CORONARY ANGIOPLASTY WITH STENT PLACEMENT Left 06/15/2000    Stent to RCA   • SKIN CANCER EXCISION     • TOTAL HIP ARTHROPLASTY  10/14/2008   • TOTAL HIP ARTHROPLASTY     ,   Family History   Problem Relation Age of Onset   • Stroke Mother    • Cancer Father    • Heart disease Sister    • Heart disease Brother    • Heart attack Brother    • Heart disease Sister    • No Known Problems Sister    • No Known Problems Sister    • No Known Problems Sister    • Heart disease Sister    • Heart failure Sister    • Stroke Brother    • Stroke Brother    • No Known Problems Brother    ,   Social History     Tobacco Use   • Smoking status: Never Smoker   • Smokeless tobacco:  Never Used   Substance Use Topics   • Alcohol use: Yes     Alcohol/week: 0.6 oz     Types: 1 Shots of liquor per week     Comment: once a year when he visits his brother in [place].   • Drug use: No   ,     Medications  Current Outpatient Medications   Medication Sig Dispense Refill   • albuterol (VENTOLIN HFA) 108 (90 Base) MCG/ACT inhaler Inhale 2 puffs Every 4 (Four) Hours As Needed for Wheezing.     • aspirin 81 MG chewable tablet Chew 81 mg Daily.     • benazepril (LOTENSIN) 20 MG tablet Take 20 mg by mouth Daily.     • diltiaZEM (CARDIZEM) 90 MG tablet Take 90 mg by mouth 2 (Two) Times a Day.     • furosemide (LASIX) 40 MG tablet Take 40 mg by mouth Daily.     • metoprolol succinate XL (TOPROL-XL) 25 MG 24 hr tablet Take 25 mg by mouth Daily.     • nitroglycerin (NITROSTAT) 0.4 MG SL tablet 1 under the tongue as needed for angina, may repeat q5mins for up three doses 30 tablet 2   • rosuvastatin (CRESTOR) 10 MG tablet Take 10 mg by mouth Daily.     • umeclidinium-vilanterol (ANORO ELLIPTA) 62.5-25 MCG/INH aerosol powder  inhaler Inhale 1 puff Daily.     • warfarin (COUMADIN) 2.5 MG tablet Take 2.5 mg by mouth Every Other Day. Every other day     • warfarin (COUMADIN) 5 MG tablet Take 5 mg by mouth Every Other Day.       No current facility-administered medications for this visit.        Allergies:  Patient has no known allergies.    Review of Systems  Review of Systems   HENT: Negative for nosebleeds.    Cardiovascular: Positive for dyspnea on exertion. Negative for chest pain, claudication, irregular heartbeat, leg swelling, near-syncope, orthopnea, palpitations, paroxysmal nocturnal dyspnea and syncope.   Respiratory: Negative for cough, hemoptysis and shortness of breath.    Gastrointestinal: Negative for dysphagia, hematemesis and melena.   Genitourinary: Negative for hematuria.   All other systems reviewed and are negative.      Objective     Physical Exam:  /64 (BP Location: Left arm, Patient  "Position: Sitting, Cuff Size: Adult)   Pulse 59   Ht 186.7 cm (73.5\")   Wt 103 kg (226 lb)   SpO2 98%   BMI 29.41 kg/m²   Physical Exam   Constitutional: He is oriented to person, place, and time. He appears well-developed and well-nourished. No distress.   HENT:   Head: Normocephalic and atraumatic.   Eyes: No scleral icterus.   Neck: Normal range of motion. Neck supple.   Cardiovascular: Normal rate and normal heart sounds. An irregularly irregular rhythm present. Exam reveals no gallop and no friction rub.   No murmur heard.  Pulmonary/Chest: Effort normal and breath sounds normal. No respiratory distress. He has no wheezes. He has no rales.   Abdominal: Soft. Bowel sounds are normal. He exhibits no distension. There is no tenderness.   Musculoskeletal: He exhibits no edema.   Neurological: He is alert and oriented to person, place, and time.   Skin: Skin is warm and dry. He is not diaphoretic. No erythema.   Psychiatric: He has a normal mood and affect. His behavior is normal.       Results Review:    ECG 12 Lead  Date/Time: 6/10/2019 11:54 AM  Performed by: Darnell Barbosa MD  Authorized by: Darnell Barbosa MD   Comparison: compared with previous ECG   Similar to previous ECG  Rhythm: atrial fibrillation  Ectopy: unifocal PVCs  Rate: normal  Conduction: conduction normal  ST Segments: ST segments normal  T Waves: T waves normal  QRS axis: normal  Other findings: non-specific ST-T wave changes    Clinical impression: abnormal EKG            Anticoagulation Visit on 05/30/2019   Component Date Value Ref Range Status   • INR 05/30/2019 2.85   Final       Assessment/Plan   Cristhian was seen today for follow-up, coronary artery disease, atrial fibrillation, hypertension and hyperlipidemia.    Diagnoses and all orders for this visit:    Chronic atrial fibrillation (CMS/HCC), Chronic, anticoagulated, rate controlled and asymptomatic    Coronary artery disease involving native coronary artery of native heart without " angina pectoris, The patient denies chest pain, shortness of breath, dyspnea on exertion, orthopnea, PND, edema. There is no evidence of ongoing ischemia    Essential hypertension, good control    Mixed hyperlipidemia, Patient's statin therapy is followed by PCP.    Chronic MCDONALD, multifactorial and slowly getting worse.     Other orders  -     ECG 12 Lead        Patient's Body mass index is 29.41 kg/m². BMI is within normal parameters. No follow-up required..

## 2019-07-02 ENCOUNTER — ANTICOAGULATION VISIT (OUTPATIENT)
Dept: CARDIOLOGY | Facility: CLINIC | Age: 84
End: 2019-07-02

## 2019-07-02 LAB — INR PPP: 2.39

## 2019-08-08 ENCOUNTER — ANTICOAGULATION VISIT (OUTPATIENT)
Dept: CARDIOLOGY | Facility: CLINIC | Age: 84
End: 2019-08-08

## 2019-08-08 LAB — INR PPP: 2.3

## 2019-09-03 ENCOUNTER — ANTICOAGULATION VISIT (OUTPATIENT)
Dept: CARDIOLOGY | Facility: CLINIC | Age: 84
End: 2019-09-03

## 2019-09-03 LAB — INR PPP: 3.04

## 2019-10-07 ENCOUNTER — ANTICOAGULATION VISIT (OUTPATIENT)
Dept: CARDIOLOGY | Facility: CLINIC | Age: 84
End: 2019-10-07

## 2019-10-07 LAB — INR PPP: 2.81

## 2019-11-07 ENCOUNTER — ANTICOAGULATION VISIT (OUTPATIENT)
Dept: CARDIOLOGY | Facility: CLINIC | Age: 84
End: 2019-11-07

## 2019-11-07 LAB — INR PPP: 3.2

## 2019-11-26 ENCOUNTER — ANTICOAGULATION VISIT (OUTPATIENT)
Dept: CARDIOLOGY | Facility: CLINIC | Age: 84
End: 2019-11-26

## 2019-12-02 ENCOUNTER — ANTICOAGULATION VISIT (OUTPATIENT)
Dept: CARDIOLOGY | Facility: CLINIC | Age: 84
End: 2019-12-02

## 2019-12-02 DIAGNOSIS — I48.91 ATRIAL FIBRILLATION, UNSPECIFIED TYPE (HCC): Primary | ICD-10-CM

## 2019-12-02 LAB — INR PPP: 2.78

## 2020-01-09 ENCOUNTER — ANTICOAGULATION VISIT (OUTPATIENT)
Dept: CARDIOLOGY | Facility: CLINIC | Age: 85
End: 2020-01-09

## 2020-01-09 DIAGNOSIS — I48.91 ATRIAL FIBRILLATION, UNSPECIFIED TYPE (HCC): Primary | ICD-10-CM

## 2020-01-10 ENCOUNTER — ANTICOAGULATION VISIT (OUTPATIENT)
Dept: CARDIOLOGY | Facility: CLINIC | Age: 85
End: 2020-01-10

## 2020-01-10 LAB — INR PPP: 2.83

## 2020-02-04 ENCOUNTER — ANTICOAGULATION VISIT (OUTPATIENT)
Dept: CARDIOLOGY | Facility: CLINIC | Age: 85
End: 2020-02-04

## 2020-02-04 LAB — INR PPP: 2.87

## 2020-03-03 ENCOUNTER — ANTICOAGULATION VISIT (OUTPATIENT)
Dept: CARDIOLOGY | Facility: CLINIC | Age: 85
End: 2020-03-03

## 2020-03-03 LAB — INR PPP: 2.82

## 2020-05-05 LAB — INR PPP: 2.3

## 2020-05-06 ENCOUNTER — ANTICOAGULATION VISIT (OUTPATIENT)
Dept: CARDIOLOGY | Facility: CLINIC | Age: 85
End: 2020-05-06

## 2020-06-11 ENCOUNTER — ANTICOAGULATION VISIT (OUTPATIENT)
Dept: CARDIOLOGY | Facility: CLINIC | Age: 85
End: 2020-06-11

## 2020-06-11 LAB — INR PPP: 2.7

## 2020-06-12 ENCOUNTER — OFFICE VISIT (OUTPATIENT)
Dept: CARDIOLOGY | Facility: CLINIC | Age: 85
End: 2020-06-12

## 2020-06-12 DIAGNOSIS — E78.2 MIXED HYPERLIPIDEMIA: ICD-10-CM

## 2020-06-12 DIAGNOSIS — R06.02 SHORTNESS OF BREATH: ICD-10-CM

## 2020-06-12 DIAGNOSIS — I25.10 CORONARY ARTERY DISEASE INVOLVING NATIVE CORONARY ARTERY OF NATIVE HEART WITHOUT ANGINA PECTORIS: Primary | ICD-10-CM

## 2020-06-12 DIAGNOSIS — Z79.01 ANTICOAGULATED WITH WARFARIN: ICD-10-CM

## 2020-06-12 DIAGNOSIS — I48.21 PERMANENT ATRIAL FIBRILLATION (HCC): ICD-10-CM

## 2020-06-12 DIAGNOSIS — I10 ESSENTIAL HYPERTENSION: ICD-10-CM

## 2020-06-12 PROCEDURE — 99442 PR PHYS/QHP TELEPHONE EVALUATION 11-20 MIN: CPT | Performed by: NURSE PRACTITIONER

## 2020-06-12 RX ORDER — ALLOPURINOL 300 MG/1
300 TABLET ORAL DAILY
COMMUNITY

## 2020-06-12 NOTE — PROGRESS NOTES
Subjective:     Encounter Date:06/12/2020      Patient ID: Cristhian Tatum is a 85 y.o. male with a history of coronary artery disease, permanent atrial fibrillation, hypertension, hyperlipidemia, and chronic anticoagulation.    You have chosen to receive care through a telephone visit. Do you consent to use a telephone visit for your medical care today? YES    This visit has been rescheduled as a phone visit to comply with patient safety concerns in accordance with CDC recommendations. Total time of discussion was 15 minutes.    Chief Complaint: follow up  Coronary Artery Disease   Presents for follow-up visit. Pertinent negatives include no chest pain, chest pressure, chest tightness, dizziness, leg swelling, palpitations, shortness of breath or weight gain. Risk factors include hyperlipidemia. The symptoms have been stable.   Atrial Fibrillation   Presents for follow-up visit. Symptoms are negative for chest pain, dizziness, palpitations, shortness of breath, syncope and weakness. The symptoms have been stable. Past medical history includes atrial fibrillation, CAD and hyperlipidemia.   Hypertension   This is a chronic problem. The current episode started more than 1 year ago. The problem has been rapidly improving since onset. The problem is controlled. Pertinent negatives include no chest pain, malaise/fatigue, orthopnea, palpitations, peripheral edema, PND or shortness of breath.   Hyperlipidemia   This is a chronic problem. The current episode started more than 1 year ago. The problem is controlled. Recent lipid tests were reviewed and are normal. Pertinent negatives include no chest pain or shortness of breath.     Patient called today for a follow up. He denies complaints. He continues to follow with our coumadin clinic. He denies bleeding. He notes his BP is controlled in other offices and does not check it daily.      The following portions of the patient's history were reviewed and updated as  appropriate: allergies, current medications, past family history, past medical history, past social history, past surgical history and problem list.    No Known Allergies    Current Outpatient Medications:   •  albuterol (VENTOLIN HFA) 108 (90 Base) MCG/ACT inhaler, Inhale 2 puffs Every 4 (Four) Hours As Needed for Wheezing., Disp: , Rfl:   •  allopurinol (ZYLOPRIM) 300 MG tablet, Take 300 mg by mouth Daily., Disp: , Rfl:   •  aspirin 81 MG chewable tablet, Chew 81 mg Daily., Disp: , Rfl:   •  benazepril (LOTENSIN) 20 MG tablet, Take 20 mg by mouth Daily. Takes 1/2 tab daily, Disp: , Rfl:   •  diltiaZEM (CARDIZEM) 90 MG tablet, Take 90 mg by mouth 2 (Two) Times a Day., Disp: , Rfl:   •  Fluticasone-Umeclidin-Vilant (Trelegy Ellipta) 100-62.5-25 MCG/INH aerosol powder , Inhale 1 puff., Disp: , Rfl:   •  furosemide (LASIX) 40 MG tablet, Take 40 mg by mouth Daily., Disp: , Rfl:   •  metoprolol succinate XL (TOPROL-XL) 25 MG 24 hr tablet, Take 25 mg by mouth Daily., Disp: , Rfl:   •  nitroglycerin (NITROSTAT) 0.4 MG SL tablet, 1 under the tongue as needed for angina, may repeat q5mins for up three doses, Disp: 30 tablet, Rfl: 2  •  rosuvastatin (CRESTOR) 10 MG tablet, Take 10 mg by mouth Daily., Disp: , Rfl:   •  warfarin (COUMADIN) 2.5 MG tablet, Take 2.5 mg by mouth Every Other Day. Every other day, Disp: , Rfl:   •  warfarin (COUMADIN) 5 MG tablet, Take 5 mg by mouth Every Other Day., Disp: , Rfl:   Past Medical History:   Diagnosis Date   • A-fib (CMS/HCC)    • Abnormal cardiovascular function study    • Atherosclerosis of native coronary artery of native heart without angina pectoris    • CAD (coronary artery disease)    • Cancer (CMS/HCC)    • Chronic atrial fibrillation (CMS/HCC)    • Exertional dyspnea    • History of essential hypertension    • HLD (hyperlipidemia)    • HTN (hypertension)    • Hyperlipidemia    • Sleep apnea        Social History     Socioeconomic History   • Marital status:      Spouse  name: Not on file   • Number of children: Not on file   • Years of education: Not on file   • Highest education level: Not on file   Tobacco Use   • Smoking status: Never Smoker   • Smokeless tobacco: Never Used   Substance and Sexual Activity   • Alcohol use: Yes     Alcohol/week: 1.0 standard drinks     Types: 1 Shots of liquor per week     Comment: once a year when he visits his brother in [place].   • Drug use: No   • Sexual activity: Defer       Review of Systems   Constitution: Negative for malaise/fatigue, weight gain and weight loss.   Cardiovascular: Negative for chest pain, dyspnea on exertion, irregular heartbeat, leg swelling, near-syncope, orthopnea, palpitations, paroxysmal nocturnal dyspnea and syncope.   Respiratory: Negative for chest tightness, cough, shortness of breath, sleep disturbances due to breathing, sputum production and wheezing.    Skin: Negative for dry skin, flushing, itching and rash.   Gastrointestinal: Negative for hematemesis and hematochezia.   Neurological: Negative for dizziness, light-headedness, loss of balance and weakness.   All other systems reviewed and are negative.      Procedures  There were no vitals taken for this visit.       Objective:     Physical Exam   Constitutional: He is oriented to person, place, and time. Vital signs are normal. No distress.   Cardiovascular: Normal rate.   Pulmonary/Chest: Effort normal. No respiratory distress.   Neurological: He is alert and oriented to person, place, and time.   Psychiatric: He has a normal mood and affect. His speech is normal and behavior is normal. Judgment and thought content normal. Cognition and memory are normal.       Lab Review:   I have reviewed recent PT/INR, previous office notes and most recent cardiac test results.    Lab Results   Component Value Date    INR 2.70 06/10/2020    INR 2.70 06/10/2020    INR 3.98 05/04/2020    PROTIME 28.7 (H) 06/10/2020    PROTIME 38.8 (H) 05/04/2020    PROTIME 29.7 (H)  03/03/2020         Karie 6/17:  Interpretation Summary        · Left ventricular ejection fraction is normal (Calculated EF = 56%).  · Myocardial perfusion imaging indicates a normal myocardial perfusion study with no evidence of ischemia.  · Impressions are consistent with a low risk study.     Low risk for ischemia     Echo 7/17:  Interpretation Summary     · Left ventricular systolic function is normal. Estimated EF = 65%.  · Left atrial cavity size is moderately dilated.  · Right ventricular cavity is moderately dilated.  · Mildly reduced right ventricular systolic function noted.  · calcification of the aortic valve  · Mild mitral valve regurgitation is present  · Mild aortic valve regurgitation is present.  · Mild tricuspid valve regurgitation is present.           Assessment:          Diagnosis Plan   1. Coronary artery disease involving native coronary artery of native heart without angina pectoris     2. Permanent atrial fibrillation (CMS/HCC)     3. Essential hypertension     4. Mixed hyperlipidemia     5. Anticoagulated with warfarin     6. Shortness of breath            Plan:       1.CAD- nonobstructive. Low risk karie in 2017. On ASA and statin.  2. Afib- rate controlled. On Coumadin.   3. HTN- reports controlled. Followed by PCP.  4. HLD- followed by PCP. On statin  5. Coumadin- followed by our Coumadin clinic. Last INR therapeutic at 2.7. Denies bleeding.   6. SOB- chronic. Followed by pulmonary     Follow up in 1 year or sooner if symptoms worsen.   Advance Care Planning   ACP discussion was held with the patient during this visit. Patient does not have an advance directive, declines further assistance.

## 2020-07-07 LAB — INR PPP: 3.61

## 2020-07-08 ENCOUNTER — ANTICOAGULATION VISIT (OUTPATIENT)
Dept: CARDIOLOGY | Facility: CLINIC | Age: 85
End: 2020-07-08

## 2020-07-20 ENCOUNTER — ANTICOAGULATION VISIT (OUTPATIENT)
Dept: CARDIOLOGY | Facility: CLINIC | Age: 85
End: 2020-07-20

## 2020-07-20 LAB — INR PPP: 3.73

## 2020-08-04 ENCOUNTER — ANTICOAGULATION VISIT (OUTPATIENT)
Dept: CARDIOLOGY | Facility: CLINIC | Age: 85
End: 2020-08-04

## 2020-08-04 LAB — INR PPP: 2.15

## 2020-08-20 ENCOUNTER — ANTICOAGULATION VISIT (OUTPATIENT)
Dept: CARDIOLOGY | Facility: CLINIC | Age: 85
End: 2020-08-20

## 2020-08-21 ENCOUNTER — ANTICOAGULATION VISIT (OUTPATIENT)
Dept: CARDIOLOGY | Facility: CLINIC | Age: 85
End: 2020-08-21

## 2020-08-21 LAB — INR PPP: 2.07

## 2020-09-03 ENCOUNTER — ANTICOAGULATION VISIT (OUTPATIENT)
Dept: CARDIOLOGY | Facility: CLINIC | Age: 85
End: 2020-09-03

## 2020-09-03 LAB — INR PPP: 2.11

## 2020-09-29 ENCOUNTER — ANTICOAGULATION VISIT (OUTPATIENT)
Dept: CARDIOLOGY | Facility: CLINIC | Age: 85
End: 2020-09-29

## 2020-09-29 LAB — INR PPP: 2.62

## 2020-11-04 ENCOUNTER — ANTICOAGULATION VISIT (OUTPATIENT)
Dept: CARDIOLOGY | Facility: CLINIC | Age: 85
End: 2020-11-04

## 2020-11-04 LAB — INR PPP: 2.89

## 2020-12-02 LAB — INR PPP: 2.42

## 2020-12-04 ENCOUNTER — ANTICOAGULATION VISIT (OUTPATIENT)
Dept: CARDIOLOGY | Facility: CLINIC | Age: 85
End: 2020-12-04

## 2020-12-31 ENCOUNTER — ANTICOAGULATION VISIT (OUTPATIENT)
Dept: CARDIOLOGY | Facility: CLINIC | Age: 85
End: 2020-12-31

## 2021-01-04 DIAGNOSIS — I48.91 ATRIAL FIBRILLATION, UNSPECIFIED TYPE (HCC): Primary | ICD-10-CM

## 2021-01-04 LAB — INR PPP: 2.72

## 2021-01-08 ENCOUNTER — ANTICOAGULATION VISIT (OUTPATIENT)
Dept: CARDIOLOGY | Facility: CLINIC | Age: 86
End: 2021-01-08

## 2021-02-02 ENCOUNTER — ANTICOAGULATION VISIT (OUTPATIENT)
Dept: CARDIOLOGY | Facility: CLINIC | Age: 86
End: 2021-02-02

## 2021-02-02 LAB — INR PPP: 2.17

## 2021-03-02 ENCOUNTER — ANTICOAGULATION VISIT (OUTPATIENT)
Dept: CARDIOLOGY | Facility: CLINIC | Age: 86
End: 2021-03-02

## 2021-03-02 LAB — INR PPP: 2.32

## 2021-04-05 ENCOUNTER — ANTICOAGULATION VISIT (OUTPATIENT)
Dept: CARDIOLOGY | Facility: CLINIC | Age: 86
End: 2021-04-05

## 2021-04-05 LAB — INR PPP: 2.09

## 2021-05-03 ENCOUNTER — ANTICOAGULATION VISIT (OUTPATIENT)
Dept: CARDIOLOGY | Facility: CLINIC | Age: 86
End: 2021-05-03

## 2021-05-03 LAB — INR PPP: 2.18

## 2021-06-02 LAB — INR PPP: 2.7

## 2021-06-03 ENCOUNTER — ANTICOAGULATION VISIT (OUTPATIENT)
Dept: CARDIOLOGY | Facility: CLINIC | Age: 86
End: 2021-06-03

## 2021-06-11 ENCOUNTER — TELEPHONE (OUTPATIENT)
Dept: CARDIOLOGY | Facility: CLINIC | Age: 86
End: 2021-06-11

## 2021-06-14 ENCOUNTER — OFFICE VISIT (OUTPATIENT)
Dept: CARDIOLOGY | Facility: CLINIC | Age: 86
End: 2021-06-14

## 2021-06-14 VITALS
HEART RATE: 62 BPM | WEIGHT: 221 LBS | HEIGHT: 74 IN | DIASTOLIC BLOOD PRESSURE: 60 MMHG | SYSTOLIC BLOOD PRESSURE: 160 MMHG | OXYGEN SATURATION: 97 % | BODY MASS INDEX: 28.36 KG/M2

## 2021-06-14 DIAGNOSIS — E78.2 MIXED HYPERLIPIDEMIA: ICD-10-CM

## 2021-06-14 DIAGNOSIS — I25.10 NONOCCLUSIVE CORONARY ATHEROSCLEROSIS OF NATIVE CORONARY ARTERY: Primary | ICD-10-CM

## 2021-06-14 DIAGNOSIS — Z79.01 ANTICOAGULATED WITH WARFARIN: ICD-10-CM

## 2021-06-14 DIAGNOSIS — I48.21 PERMANENT ATRIAL FIBRILLATION (HCC): ICD-10-CM

## 2021-06-14 DIAGNOSIS — I10 ESSENTIAL HYPERTENSION: ICD-10-CM

## 2021-06-14 PROCEDURE — 99214 OFFICE O/P EST MOD 30 MIN: CPT | Performed by: NURSE PRACTITIONER

## 2021-06-14 PROCEDURE — 93000 ELECTROCARDIOGRAM COMPLETE: CPT | Performed by: NURSE PRACTITIONER

## 2021-06-14 RX ORDER — CETIRIZINE HYDROCHLORIDE 10 MG/1
10 TABLET ORAL DAILY
COMMUNITY

## 2021-06-14 RX ORDER — MELOXICAM 15 MG/1
15 TABLET ORAL DAILY
COMMUNITY

## 2021-06-14 NOTE — PATIENT INSTRUCTIONS
Advance Care Planning and Advance Directives     You make decisions on a daily basis - decisions about where you want to live, your career, your home, your life. Perhaps one of the most important decisions you face is your choice for future medical care. Take time to talk with your family and your healthcare team and start planning today.  Advance Care Planning is a process that can help you:  · Understand possible future healthcare decisions in light of your own experiences  · Reflect on those decision in light of your goals and values  · Discuss your decisions with those closest to you and the healthcare professionals that care for you  · Make a plan by creating a document that reflects your wishes    Surrogate Decision Maker  In the event of a medical emergency, which has left you unable to communicate or to make your own decisions, you would need someone to make decisions for you.  It is important to discuss your preferences for medical treatment with this person while you are in good health.     Qualities of a surrogate decision maker:  • Willing to take on this role and responsibility  • Knows what you want for future medical care  • Willing to follow your wishes even if they don't agree with them  • Able to make difficult medical decisions under stressful circumstances    Advance Directives  These are legal documents you can create that will guide your healthcare team and decision maker(s) when needed. These documents can be stored in the electronic medical record.    · Living Will - a legal document to guide your care if you have a terminal condition or a serious illness and are unable to communicate. States vary by statute in document names/types, but most forms may include one or more of the following:        -  Directions regarding life-prolonging treatments        -  Directions regarding artificially provided nutrition/hydration        -  Choosing a healthcare decision maker        -  Direction  regarding organ/tissue donation    · Durable Power of  for Healthcare - this document names an -in-fact to make medical decisions for you, but it may also allow this person to make personal and financial decisions for you. Please seek the advice of an  if you need this type of document.    **Advance Directives are not required and no one may discriminate against you if you do not sign one.    Medical Orders  Many states allow specific forms/orders signed by your physician to record your wishes for medical treatment in your current state of health. This form, signed in personal communication with your physician, addresses resuscitation and other medical interventions that you may or may not want.      For more information or to schedule a time with a [facility] Advance Care Planning Facilitator contact: [email or web address] or call [phone] and someone will contact you directly.

## 2021-06-14 NOTE — PROGRESS NOTES
Subjective:     Encounter Date:06/14/2021      Patient ID: Cristhian Tatum is a 86 y.o. male with a history of nonobstructive CAD, permanent atrial fibrillation, hypertension, hyperlipidemia, and chronic anticoagulation.    Chief Complaint: follow up  Coronary Artery Disease  Presents for follow-up visit. Symptoms include chest pain. Pertinent negatives include no dizziness, leg swelling, palpitations, shortness of breath or weight gain. Risk factors include hyperlipidemia. The symptoms have been stable.   Atrial Fibrillation  Presents for follow-up visit. Symptoms include chest pain. Symptoms are negative for dizziness, palpitations, shortness of breath, syncope and weakness. The symptoms have been stable. Past medical history includes atrial fibrillation, CAD and hyperlipidemia.   Hypertension  This is a chronic problem. The current episode started more than 1 year ago. The problem has been gradually improving since onset. The problem is controlled. Associated symptoms include chest pain. Pertinent negatives include no malaise/fatigue, orthopnea, palpitations, PND or shortness of breath.   Hyperlipidemia  This is a chronic problem. The current episode started more than 1 year ago. The problem is controlled. Associated symptoms include chest pain. Pertinent negatives include no shortness of breath.     Patient presents today for a routine follow up. Patient has a history of permanent afib and is anticoagulated with Coumadin. He was previously offered an alternative anticoagulant but declined due to cost. He also has a history of nonobstructive CAD per cath in 2010. He complains of left sided, lower lateral chest pain that is dull and radiates across his abdomen. This has been ongoing for 1-2 months and occurs only in the afternoons and while at rest. He denies aggravating or alleviating factors with his pain. He has not tried antacids or SL Nitro. The pain is 3/10 at it's worst and lasts 30mins maximum and  resolves without intervention. He denies associated symptoms. He has chronic MCDONALD and follows with pulmonary in [place], [place]. He monitors his BP at home and notes readings no >130.     The following portions of the patient's history were reviewed and updated as appropriate: allergies, current medications, past family history, past medical history, past social history, past surgical history and problem list.    No Known Allergies    Current Outpatient Medications:   •  albuterol (VENTOLIN HFA) 108 (90 Base) MCG/ACT inhaler, Inhale 2 puffs Every 4 (Four) Hours As Needed for Wheezing., Disp: , Rfl:   •  allopurinol (ZYLOPRIM) 300 MG tablet, Take 300 mg by mouth Daily., Disp: , Rfl:   •  aspirin 81 MG chewable tablet, Chew 81 mg Daily., Disp: , Rfl:   •  benazepril (LOTENSIN) 20 MG tablet, Take 20 mg by mouth Daily. Takes 1/2 tab daily, Disp: , Rfl:   •  cetirizine (zyrTEC) 10 MG tablet, Take 10 mg by mouth Daily., Disp: , Rfl:   •  diltiaZEM (CARDIZEM) 90 MG tablet, Take 90 mg by mouth 2 (Two) Times a Day., Disp: , Rfl:   •  Fluticasone-Umeclidin-Vilant (Trelegy Ellipta) 100-62.5-25 MCG/INH aerosol powder , Inhale 1 puff., Disp: , Rfl:   •  furosemide (LASIX) 40 MG tablet, Take 40 mg by mouth Daily., Disp: , Rfl:   •  meloxicam (MOBIC) 15 MG tablet, Take 15 mg by mouth Daily., Disp: , Rfl:   •  metoprolol succinate XL (TOPROL-XL) 25 MG 24 hr tablet, Take 25 mg by mouth Daily., Disp: , Rfl:   •  nitroglycerin (NITROSTAT) 0.4 MG SL tablet, 1 under the tongue as needed for angina, may repeat q5mins for up three doses, Disp: 30 tablet, Rfl: 2  •  rosuvastatin (CRESTOR) 10 MG tablet, Take 10 mg by mouth Daily., Disp: , Rfl:   •  warfarin (COUMADIN) 5 MG tablet, Take 5 mg by mouth Every Other Day., Disp: , Rfl:   Past Medical History:   Diagnosis Date   • A-fib (CMS/HCC)    • Abnormal cardiovascular function study    • Atherosclerosis of native coronary artery of native heart without angina pectoris    • CAD (coronary artery  disease)    • Cancer (CMS/HCC)    • Chronic atrial fibrillation (CMS/HCC)    • Exertional dyspnea    • History of essential hypertension    • HLD (hyperlipidemia)    • HTN (hypertension)    • Hyperlipidemia    • Sleep apnea        Social History     Socioeconomic History   • Marital status:      Spouse name: Not on file   • Number of children: Not on file   • Years of education: Not on file   • Highest education level: Not on file   Tobacco Use   • Smoking status: Never Smoker   • Smokeless tobacco: Never Used   Substance and Sexual Activity   • Alcohol use: Yes     Alcohol/week: 1.0 standard drinks     Types: 1 Shots of liquor per week     Comment: once a year when he visits his brother in [place].   • Drug use: No   • Sexual activity: Defer       Review of Systems   Constitutional: Negative for malaise/fatigue, weight gain and weight loss.   Cardiovascular: Positive for chest pain and dyspnea on exertion. Negative for irregular heartbeat, leg swelling, near-syncope, orthopnea, palpitations, paroxysmal nocturnal dyspnea and syncope.   Respiratory: Negative for cough, shortness of breath, sleep disturbances due to breathing, sputum production and wheezing.    Skin: Negative for dry skin, flushing, itching and rash.   Gastrointestinal: Negative for hematemesis and hematochezia.   Neurological: Negative for dizziness, light-headedness, loss of balance and weakness.   All other systems reviewed and are negative.         Objective:     Vitals reviewed.   Constitutional:       General: Not in acute distress.     Appearance: Healthy appearance. Well-developed. Not diaphoretic.   Eyes:      General: No scleral icterus.     Conjunctiva/sclera: Conjunctivae normal.      Pupils: Pupils are equal, round, and reactive to light.   HENT:      Head: Normocephalic.    Mouth/Throat:      Pharynx: No oropharyngeal exudate.   Neck:      Vascular: No JVR.   Pulmonary:      Effort: Pulmonary effort is normal. No respiratory  "distress.      Breath sounds: Normal breath sounds. No wheezing. No rhonchi. No rales.   Chest:      Chest wall: Not tender to palpatation.   Cardiovascular:      Normal rate. Irregularly irregular rhythm.   Pulses:     Intact distal pulses.   Edema:     Peripheral edema absent.   Abdominal:      General: Bowel sounds are normal. There is no distension.      Palpations: Abdomen is soft.      Tenderness: There is no abdominal tenderness.   Musculoskeletal: Normal range of motion.      Cervical back: Normal range of motion and neck supple. Skin:     General: Skin is warm and dry.      Coloration: Skin is not pale.      Findings: No erythema or rash.   Neurological:      Mental Status: Alert, oriented to person, place, and time and oriented to person, place and time.      Deep Tendon Reflexes: Reflexes are normal and symmetric.   Psychiatric:         Behavior: Behavior normal.             ECG 12 Lead    Date/Time: 6/14/2021 2:07 PM  Performed by: Elinor Murdock APRN  Authorized by: Elinor Murdock APRN   Comparison: compared with previous ECG from 6/10/2019  Similar to previous ECG  Rhythm: atrial fibrillation  Rate: normal  BPM: 62  Conduction: conduction normal  ST Segments: ST segments normal  T Waves: T waves normal  QRS axis: normal  Other: no other findings    Clinical impression: abnormal EKG          /60   Pulse 62   Ht 186.7 cm (73.5\")   Wt 100 kg (221 lb)   SpO2 97%   BMI 28.76 kg/m²     Lab Review:   I have reviewed previous office notes, recent labs and recent cardiac testing.   Lab Results   Component Value Date    INR 2.70 06/02/2021    INR 2.70 06/02/2021    INR 2.18 05/03/2021    PROTIME 28.6 (H) 06/02/2021    PROTIME 24.2 (H) 05/03/2021    PROTIME 23.4 (H) 04/02/2021     Lexiscan 07/2017:   Interpretation Summary       · Left ventricular ejection fraction is normal (Calculated EF = 56%).  · Myocardial perfusion imaging indicates a normal myocardial perfusion study with no evidence of " ischemia.  · Impressions are consistent with a low risk study.     Low risk for ischemia          Results for orders placed during the hospital encounter of 07/20/17    Adult Transthoracic Echo Complete With Contrast    Interpretation Summary  · Left ventricular systolic function is normal. Estimated EF = 65%.  · Left atrial cavity size is moderately dilated.  · Right ventricular cavity is moderately dilated.  · Mildly reduced right ventricular systolic function noted.  · calcification of the aortic valve  · Mild mitral valve regurgitation is present  · Mild aortic valve regurgitation is present.  · Mild tricuspid valve regurgitation is present.      Cath 2010:         Assessment:          Diagnosis Plan   1. Nonocclusive coronary atherosclerosis of native coronary artery     2. Permanent atrial fibrillation (CMS/HCC)     3. Essential hypertension     4. Mixed hyperlipidemia     5. Anticoagulated with warfarin            Plan:       1.CAD- stable. CP appears to be atypical. nonobstructive per cath in 2010. Low risk rodney in 2017. Continue ASA and statin.  2. Permanent Afib- rate controlled. Continue Cardizem and Coumadin.   3. HTN- reports controlled. Followed by PCP.  4. HLD- followed by PCP. On statin  5. Coumadin- followed by our Coumadin clinic. Last INR therapeutic at 2.70. Denies bleeding.   6. Atypical CP- appears to be noncardiac as it occurs every afternoon, without exertion, lasts 30 mins and resolves without intervention. Educated to follow up with PCP for further testing regarding noncardiac CP.          Follow up in 1 year or sooner if symptoms worsen.     I spent 30 minutes caring for Cristhian on this date of service. This time includes time spent by me in the following activities:preparing for the visit, reviewing tests, obtaining and/or reviewing a separately obtained history, performing a medically appropriate examination and/or evaluation , counseling and educating the patient/family/caregiver,  documenting information in the medical record, independently interpreting results and communicating that information with the patient/family/caregiver and care coordination     Advance Care Planning   ACP discussion was held with the patient during this visit. Patient does not have an advance directive, information provided.

## 2021-07-01 ENCOUNTER — ANTICOAGULATION VISIT (OUTPATIENT)
Dept: CARDIOLOGY | Facility: CLINIC | Age: 86
End: 2021-07-01

## 2021-07-01 LAB — INR PPP: 2.71

## 2021-08-02 ENCOUNTER — ANTICOAGULATION VISIT (OUTPATIENT)
Dept: CARDIOLOGY | Facility: CLINIC | Age: 86
End: 2021-08-02

## 2021-08-02 LAB — INR PPP: 2.02

## 2021-09-02 ENCOUNTER — ANTICOAGULATION VISIT (OUTPATIENT)
Dept: CARDIOLOGY | Facility: CLINIC | Age: 86
End: 2021-09-02

## 2021-09-02 LAB — INR PPP: 2.53

## 2021-10-05 ENCOUNTER — ANTICOAGULATION VISIT (OUTPATIENT)
Dept: CARDIOLOGY | Facility: CLINIC | Age: 86
End: 2021-10-05

## 2021-10-05 LAB — INR PPP: 2.93

## 2021-11-01 ENCOUNTER — ANTICOAGULATION VISIT (OUTPATIENT)
Dept: CARDIOLOGY | Facility: CLINIC | Age: 86
End: 2021-11-01

## 2021-11-01 LAB — INR PPP: 2.58

## 2021-12-03 ENCOUNTER — ANTICOAGULATION VISIT (OUTPATIENT)
Dept: CARDIOLOGY | Facility: CLINIC | Age: 86
End: 2021-12-03

## 2021-12-03 LAB — INR PPP: 2.65

## 2022-01-27 ENCOUNTER — TELEPHONE (OUTPATIENT)
Dept: CARDIOLOGY | Facility: CLINIC | Age: 87
End: 2022-01-27

## 2022-01-27 DIAGNOSIS — Z79.01 ANTICOAGULATED WITH WARFARIN: ICD-10-CM

## 2022-01-27 DIAGNOSIS — I48.21 PERMANENT ATRIAL FIBRILLATION: Primary | ICD-10-CM

## 2022-01-27 NOTE — TELEPHONE ENCOUNTER
They need a new order for a PT/INR faxed to them at [place], [place]      Fax number [phone]    Their phone number is [phone]

## 2022-01-28 NOTE — TELEPHONE ENCOUNTER
I spoke to the lab.  New orders were placed and once they have been signed by the MD I will forward them.  The patient is not due to repeat for 6 weeks.     HV    Message text from Kiah CASH RN

## 2022-02-01 ENCOUNTER — ANTICOAGULATION VISIT (OUTPATIENT)
Dept: CARDIOLOGY | Facility: CLINIC | Age: 87
End: 2022-02-01

## 2022-02-01 LAB — INR PPP: 2.39

## 2022-03-01 ENCOUNTER — ANTICOAGULATION VISIT (OUTPATIENT)
Dept: CARDIOLOGY | Facility: CLINIC | Age: 87
End: 2022-03-01

## 2022-03-01 LAB — INR PPP: 2.95 (ref 0.9–1.1)

## 2022-04-07 ENCOUNTER — TELEPHONE (OUTPATIENT)
Dept: CARDIOLOGY | Facility: CLINIC | Age: 87
End: 2022-04-07

## 2022-04-07 NOTE — TELEPHONE ENCOUNTER
Call placed to patient for results for most recent PT/INR. Spoke with spouse who states her , , passed away on 3/4/22.